# Patient Record
(demographics unavailable — no encounter records)

---

## 2025-02-21 NOTE — PHYSICAL EXAM
[Skin Ulcer] : ulcer [Alert] : alert [Oriented to Person] : oriented to person [Oriented to Place] : oriented to place [Oriented to Time] : oriented to time [Calm] : calm [Please See PDF for [employer]] : Please See PDF for [employer]. [de-identified] : NAD [de-identified] : AT [de-identified] : supple [de-identified] : equal chest rise [de-identified] : soft NT [de-identified] : FROM

## 2025-02-21 NOTE — PLAN
[FreeTextEntry1] : 2-21-25: Plan: left hip burn wash with soap and water cont silvadene BID. cover with abd/tape supplies ordered  f/u 2-3 weeks

## 2025-02-21 NOTE — ASSESSMENT
[FreeTextEntry1] : 2-21-25: Pt here in consultation for left hip burn from a heating pad On exam: left hip wound: dermis with dead epidermis. s/p mechanical debridement No s/s of infection.

## 2025-02-21 NOTE — HISTORY OF PRESENT ILLNESS
[FreeTextEntry1] : 2-21-25:  Ms. YO JEFFERSON   presents to the office with a wound for 5 days duration.  The wound is located on  the left hip wound .  The patient has complaints of open wound.   The patient has been dressing the wound with silvadene.  The patient denies fevers or chills.  The patient has localized pain to the wound upon dressing changes.  The patient has no other complaints or associated symptoms.  HbA1c is ?????  5 days ago slept with a heating pad on hip and developed a blister.  Pt saw PMD who Rx'ed silvadene and cephalexin denies fevers or chills

## 2025-03-14 NOTE — PHYSICAL EXAM
[Skin Ulcer] : ulcer [Alert] : alert [Oriented to Person] : oriented to person [Oriented to Place] : oriented to place [Oriented to Time] : oriented to time [Calm] : calm [Please See PDF for [employer]] : Please See PDF for [employer]. [de-identified] : NAD [de-identified] : AT [de-identified] : supple [de-identified] : equal chest rise [de-identified] : FROM

## 2025-03-14 NOTE — PLAN
[FreeTextEntry1] : 2-21-25: Plan: left hip burn wash with soap and water cont silvadene BID. cover with abd/tape supplies ordered  f/u 2-3 weeks   3-14-25: Plan: let hip burn wash with soap and water honey/adaptic/gauze/abd/tape daily supplies ordered f/u 2-3 weeks

## 2025-03-14 NOTE — ASSESSMENT
[FreeTextEntry1] : 2-21-25: Pt here in consultation for left hip burn from a heating pad On exam: left hip wound: dermis with dead epidermis. s/p mechanical debridement No s/s of infection.   3-14-25: Pt here for f/u No new complaints.  Feels wound is doing better On exam: left hip wound:  smaller, slough. s/p excisional debridement No s/s of infection.

## 2025-04-11 NOTE — PLAN
[FreeTextEntry1] : 2-21-25: Plan: left hip burn wash with soap and water cont silvadene BID. cover with abd/tape supplies ordered  f/u 2-3 weeks   3-14-25: Plan: let hip burn wash with soap and water honey/adaptic/gauze/abd/tape daily supplies ordered f/u 2-3 weeks   4-11-25: Plan: let hip burn wash with soap and water honey/adaptic/gauze/tape daily f/u 2-3 weeks

## 2025-04-11 NOTE — ASSESSMENT
[FreeTextEntry1] : 2-21-25: Pt here in consultation for left hip burn from a heating pad On exam: left hip wound: dermis with dead epidermis. s/p mechanical debridement No s/s of infection.   3-14-25: Pt here for f/u No new complaints.  Feels wound is doing better On exam: left hip wound:  smaller, slough. s/p excisional debridement No s/s of infection.   4-11-25: Pt here for f/u No new complaints On exam: left hip wound:  mostly healed.  small pinhead sized opening, superficial s/p excisional debridement No s/s of infection.

## 2025-04-11 NOTE — PHYSICAL EXAM
[Skin Ulcer] : ulcer [Alert] : alert [Oriented to Person] : oriented to person [Oriented to Place] : oriented to place [Oriented to Time] : oriented to time [Calm] : calm [Please See PDF for [employer]] : Please See PDF for [employer]. [de-identified] : NAD [de-identified] : AT [de-identified] : supple [de-identified] : equal chest rise [de-identified] : FROM

## 2025-05-02 NOTE — PLAN
[FreeTextEntry1] : 2-21-25: Plan: left hip burn wash with soap and water cont silvadene BID. cover with abd/tape supplies ordered  f/u 2-3 weeks   3-14-25: Plan: let hip burn wash with soap and water honey/adaptic/gauze/abd/tape daily supplies ordered f/u 2-3 weeks   4-11-25: Plan: let hip burn wash with soap and water honey/adaptic/gauze/tape daily f/u 2-3 weeks   5-2-25: Plan: Left hip burn:HEALED wash with soap and water Moisturize intact skin. Do not put between toes.  sunscreen when exposed to sun f/u prn

## 2025-05-02 NOTE — END OF VISIT
[Time Spent: ___ minutes] : I have spent [unfilled] minutes of time on the encounter which excludes teaching and separately reported services.
Initiate Treatment: Spironolactone 50mg qd
Continue Regimen: C,w Epiduo to use once a day to full face.
Detail Level: Zone

## 2025-05-02 NOTE — PHYSICAL EXAM
[Alert] : alert [Oriented to Person] : oriented to person [Oriented to Place] : oriented to place [Oriented to Time] : oriented to time [Calm] : calm [Please See PDF for [employer]] : Please See PDF for [employer]. [Skin Ulcer] : no ulcer [de-identified] : NAD [de-identified] : AT [de-identified] : supple [de-identified] : equal chest rise [de-identified] : FROM

## 2025-05-02 NOTE — ASSESSMENT
[FreeTextEntry1] : 2-21-25: Pt here in consultation for left hip burn from a heating pad On exam: left hip wound: dermis with dead epidermis. s/p mechanical debridement No s/s of infection.   3-14-25: Pt here for f/u No new complaints.  Feels wound is doing better On exam: left hip wound:  smaller, slough. s/p excisional debridement No s/s of infection.   4-11-25: Pt here for f/u No new complaints On exam: left hip wound:  mostly healed.  small pinhead sized opening, superficial s/p excisional debridement No s/s of infection.   5-2-25: Pt here for f/u No new complaints On exam: left hip wound: new epithelialization No s/s of infection. HEALED